# Patient Record
Sex: MALE | Race: BLACK OR AFRICAN AMERICAN | NOT HISPANIC OR LATINO | Employment: STUDENT | URBAN - METROPOLITAN AREA
[De-identification: names, ages, dates, MRNs, and addresses within clinical notes are randomized per-mention and may not be internally consistent; named-entity substitution may affect disease eponyms.]

---
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
## 2022-01-06 ENCOUNTER — OFFICE VISIT (OUTPATIENT)
Dept: SPORTS MEDICINE | Facility: CLINIC | Age: 22
End: 2022-01-06
Payer: COMMERCIAL

## 2022-01-06 ENCOUNTER — CLINICAL SUPPORT (OUTPATIENT)
Dept: REHABILITATION | Facility: HOSPITAL | Age: 22
End: 2022-01-06
Payer: COMMERCIAL

## 2022-01-06 VITALS
HEART RATE: 52 BPM | BODY MASS INDEX: 23.43 KG/M2 | DIASTOLIC BLOOD PRESSURE: 67 MMHG | SYSTOLIC BLOOD PRESSURE: 114 MMHG | HEIGHT: 72 IN | WEIGHT: 173 LBS

## 2022-01-06 DIAGNOSIS — Z98.890 S/P ACL RECONSTRUCTION: Primary | ICD-10-CM

## 2022-01-06 DIAGNOSIS — M25.562 ACUTE PAIN OF LEFT KNEE: ICD-10-CM

## 2022-01-06 PROCEDURE — 1159F PR MEDICATION LIST DOCUMENTED IN MEDICAL RECORD: ICD-10-PCS | Mod: CPTII,S$GLB,, | Performed by: ORTHOPAEDIC SURGERY

## 2022-01-06 PROCEDURE — 99999 PR PBB SHADOW E&M-EST. PATIENT-LVL III: ICD-10-PCS | Mod: PBBFAC,,, | Performed by: ORTHOPAEDIC SURGERY

## 2022-01-06 PROCEDURE — 3008F BODY MASS INDEX DOCD: CPT | Mod: CPTII,S$GLB,, | Performed by: ORTHOPAEDIC SURGERY

## 2022-01-06 PROCEDURE — 1159F MED LIST DOCD IN RCRD: CPT | Mod: CPTII,S$GLB,, | Performed by: ORTHOPAEDIC SURGERY

## 2022-01-06 PROCEDURE — 99214 PR OFFICE/OUTPT VISIT, EST, LEVL IV, 30-39 MIN: ICD-10-PCS | Mod: S$GLB,,, | Performed by: ORTHOPAEDIC SURGERY

## 2022-01-06 PROCEDURE — 3074F PR MOST RECENT SYSTOLIC BLOOD PRESSURE < 130 MM HG: ICD-10-PCS | Mod: CPTII,S$GLB,, | Performed by: ORTHOPAEDIC SURGERY

## 2022-01-06 PROCEDURE — 99214 OFFICE O/P EST MOD 30 MIN: CPT | Mod: S$GLB,,, | Performed by: ORTHOPAEDIC SURGERY

## 2022-01-06 PROCEDURE — 3074F SYST BP LT 130 MM HG: CPT | Mod: CPTII,S$GLB,, | Performed by: ORTHOPAEDIC SURGERY

## 2022-01-06 PROCEDURE — 3008F PR BODY MASS INDEX (BMI) DOCUMENTED: ICD-10-PCS | Mod: CPTII,S$GLB,, | Performed by: ORTHOPAEDIC SURGERY

## 2022-01-06 PROCEDURE — 99999 PR PBB SHADOW E&M-EST. PATIENT-LVL III: CPT | Mod: PBBFAC,,, | Performed by: ORTHOPAEDIC SURGERY

## 2022-01-06 PROCEDURE — 3078F PR MOST RECENT DIASTOLIC BLOOD PRESSURE < 80 MM HG: ICD-10-PCS | Mod: CPTII,S$GLB,, | Performed by: ORTHOPAEDIC SURGERY

## 2022-01-06 PROCEDURE — 3078F DIAST BP <80 MM HG: CPT | Mod: CPTII,S$GLB,, | Performed by: ORTHOPAEDIC SURGERY

## 2022-01-06 PROCEDURE — 97110 THERAPEUTIC EXERCISES: CPT | Mod: CQ

## 2022-01-06 NOTE — PROGRESS NOTES
Physical Therapy Progress Note     Name: Jose Emery  Clinic Number: [id number]    Therapy Diagnosis:   Encounter Diagnosis   Name Primary?    Acute pain of left knee      Physician: Robinson Corrigan PA-C    Visit Date: 1/6/2022    Physician Orders: PT Eval and Treat   Medical Diagnosis from Referral: S83.512A (ICD-10-CM) - Rupture of anterior cruciate ligament of left knee, initial encounter  Evaluation Date: 2/11/2021  Authorization Period Expiration: 12/31/2021  Plan of Care Expiration: 7/29/2021  Visit # / Visits authorized: 33/30     Time In: 0815  Time Out:0910  Total Billable Time:  53 minutes     Precautions: Standard, s/p L ACL reconstruction with BTB graft on 2/10/2021; Postop plan is for him to proceed with a bone-patellar tendon-bone autograft ACL reconstruction protocol.     PROCEDURES on 2/10/2021:   1. Left knee anterior cruciate ligament reconstruction with ipsilateral bone-patellar tendon-bone autograft.   2. Left knee arthroscopic chondroplasty  3. Left knee arthroscopic loose body removal    Subjective   Pt no pain today and has been working out with [employer] w/o issues. Min soreness with cutting.     Pt is 9 months post op  He was compliant with home exercise program.  Response to previous treatment: no adverse effects  Functional change: working out with team     Pain: 0/10  Location: left knee     Objective     Performance Evaluation: 1/6/22  ROM:  6 degrees hyperextension to 135 degrees flexion      Jose received therapeutic activities to develop strength, endurance, ROM and flexibility for 45 minutes including:  Elliptical 10' 1' fwd/1'bwd rotate for increased ROM, endurance and circulation lv 5  Dynamic functional mobility work  <-> jog/back pedal 3 x  <-> lateral shuffle 3 x  <-> carioca 3 x  <-> butt kick/high knee/ A skip/ power skip  <-> knee pull/quad pull  <-> walking lunge  <-> hip hinge/leg swing  <-> Frankenstein  <-> Lateral Over/Under  <-> WGS  <-> hip flexor/hamstring  stretch/ closed gate  3 x up/down dog/ heel stretch/ inch worm    1/6/2022  Strength:  Biodex test: 60 degrees/second  60 degree/second Quad 22.% deficit, Hamstring 3.5% deficit  Detailed report in media section    Sport cord test : passed last week   45/54 ( 46/54) is passing    Ice x 10 mins L knee for comfort.    Home Exercises Provided and Patient Education Provided     Education provided:   - proper technique for exercises, exercise selection, load progression    Written Home Exercises Provided: Patient instructed to cont prior HEP.  Exercises were reviewed and Jose was able to demonstrate them prior to the end of the session.  Jose demonstrated good  understanding of the education provided.     See EMR under Patient Instructions for exercises provided at 6-17-21.    Assessment   Pt passing sports cord test and retake of Biodex. No increased pain symptoms during warm up or testing. Pt with MD visit for possible return to sports clearance.   Jose is progressing well towards his goals.   Pt prognosis is Good.     Pt will continue to benefit from skilled outpatient physical therapy to address the deficits listed in the problem list box on initial evaluation, provide pt/family education and to maximize pt's level of independence in the home and community environment.     Pt's spiritual, cultural and educational needs considered and pt agreeable to plan of care and goals.    Anticipated barriers to physical therapy: none      GOALS: Short Term Goals:  12 weeks  1.Report decreased L knee pain  < / =  0/10  to increase tolerance for walking/ADLs.- met  2. Increase knee ROM to 10 hyperextension to 135 degrees flexion in order to be able to perform ADLs without difficulty.-met  3. Increase strength to 4-/5 MMT grade in L LE  to increase tolerance for ADL and work activities.- not met progressing  4. Pt to tolerate HEP to improve ROM and independence with ADL's-met  5. Pt will be able to perform SL squat without  c/o pain or compensation.- met     Long Term Goals: 24 weeks- Not met  1.Report decreased L knee pain < / = 0/10  to increase tolerance for recreational activities.- met  2.Patient goal: To be able to play basketball again.  - progressing  3.Increase strength to >/= 4+/5 in L LE  to increase tolerance for ADL and work activities.-  met  4. Pt will be able to perform jumping/hopping/running without compensation and without c/o pain.- progressing    Plan     Will see pt in 4 weeks to retest.      Ed Echavarria, PTA, STS

## 2022-01-06 NOTE — PROGRESS NOTES
CC: Left knee ACL post op 11 months    Patient is here for his 11 months post op appointment s/p below and is doing well.   Patient is doing PT at [facility] location and is progressing as expected.  He is currently progressing deceleration and cutting.   He goes to Pt 1x weekly. He is working with AT at school and completing strength and conditioning.   He has returned to practice with no issues.     He has passed his sport cord test.       DATE OF PROCEDURE: 2/10/2021      PREOPERATIVE DIAGNOSES:   1. Left knee anterior cruciate ligament tear.   2. Left knee chondromalacia  3. Left knee loose body     POSTOPERATIVE DIAGNOSES:   1. Left knee anterior cruciate ligament tear.   2. Left knee chondromalacia  3. Left knee loose body     PROCEDURES:   1. Left knee anterior cruciate ligament reconstruction with ipsilateral bone-patellar tendon-bone autograft.   2. Left knee arthroscopic chondroplasty  3. Left knee arthroscopic loose body removal     SURGEON: Ed Carbajal M.D.    PE:    /67   Pulse (!) 52   Ht 6' (1.829 m)   Wt 78.5 kg (173 lb)   BMI 23.46 kg/m²      Left knee:    Incision clean/dry/intact  No sign of infection  mild joint effusion  Compartments soft  Neurovascular status intact in extremity    PROM 0 to 130 degrees  Decreased quad strength  Minimal to no effusion    X-rays left knee (2/25/2021):  FINDINGS:  Two views: There is ACL repair and a joint effusion.  No fracture dislocation bone destruction or significant complication seen.    Assessment:  11 months s/p left knee medial reconstruction with ipsilateral BTB autograft, chondroplasty, loose body removal    Plan:  1. Return to sport with no restrictions.     2. 1 - 2 sessions with Justice Sorto.     All questions were answered. Instructed patient to call with questions or concerns in the interim.

## 2022-10-26 DIAGNOSIS — L02.413 CUTANEOUS ABSCESS OF RIGHT UPPER EXTREMITY: Primary | ICD-10-CM

## 2022-10-26 RX ORDER — SULFAMETHOXAZOLE AND TRIMETHOPRIM 400; 80 MG/1; MG/1
1 TABLET ORAL 2 TIMES DAILY
Qty: 20 TABLET | Refills: 0 | Status: SHIPPED | OUTPATIENT
Start: 2022-10-26 | End: 2022-11-05

## 2022-10-26 NOTE — PROGRESS NOTES
[facility] NOTE  Date of Evaluation: 10/26/2022    HPI:  Right elbow with tender fluid collection    : Keri  Sport:  MBB  Year: Connor  Major:  [employer]  [place]:  [facility]      REVIEW OF SYSTEMS:   Constitution: Patient denies fever or chills.  Eyes: Patient denies eye pain or vision changes.  CVS: Patient denies chest pain.  Lungs: Patient denies shortness of breath or cough.  Abdomen: Patient denies any stomach pain, nausea, vomiting, or diarrhea  Skin: Patient denies skin rash or itching.    Musculoskeletal: Patient denies recent injuries or trauma.  Neuro: Patient denies any numbness or tingling in upper or lower extremities.  Psych: Patient denies any current anxiety or nervousness.     PHYSICAL EXAMINATION:  Gen: NAD.  Psych: Affect & judgment nl.  Neuro: Grossly CNI. PUGH.  HEENT: -Trach dev. -Eye d/c. -Rhinorrhea.  CV: Color nl. -E/C/C. WWPx4.  Pulm: -Dyspnea. -Cough.  Lymph: -Edema.  Int: +TTP .5cm abscess right elbow    ASSESSMENT:  right elbow abscess if this we      PLAN:  Bactrim DS BID x 10 days              All questions have been answered.  Plan was discussed with athletes .  Communication was sent to my [facility] regarding any orders/appointments/labs that need to be scheduled.    This note was completed in the presence of the physician noted above    This note is dictated using the M*Modal Fluency Direct word recognition program. There are word recognition mistakes that are occasionally missed on review.

## 2023-01-31 ENCOUNTER — ATHLETIC TRAINING SESSION (OUTPATIENT)
Dept: SPORTS MEDICINE | Facility: CLINIC | Age: 23
End: 2023-01-31
Payer: COMMERCIAL

## 2023-02-01 NOTE — PROGRESS NOTES
Subjective:      On behalf of 23:    Chief Complaint: Jose Emery is a 22 y.o. male student at [employer] who complains of R knee pain. Athlete has no PMHx of R knee injuries. No radiating pain experienced. Jose mentions he was participating in a home mbb game and he took a fall resulting in his leg bending into an akward position resulting in him experiencing a sharp pain. P! Scale 6/10.    Handedness: right-handed  Sport played:      Level:          Jose also participates in basketball.    ROS                Objective:        General: Jose is well-developed, well-nourished, appears stated age, in no acute distress, alert and oriented to time, place and person.             Right Ankle/Foot Exam     Tests   Heel Walk: able to perform  Tiptoe Walk: able to perform  Single Heel Rise: able to perform  Double Heel Rise: unable to perform double heel rise      Right Knee Exam     Tenderness   The patient is tender to palpation of the MCL and medial joint line.    Tests   Meniscus   Jaylon:  Medial - negative   Ligament Examination   MCL - Valgus: abnormal - grade I  Pivot Shift: abnormal    Comments:  Athlete had pain with supine single leg raise and bring knee to chest actively, however was still able to complete.     Left Knee Exam   Left knee exam is normal.Back (L-Spine & T-Spine) / Neck (C-Spine) Exam     Back (L-Spine & T-Spine) Tests   Right Side Tests  Squat Test: able to perform      Muscle Strength   Right Lower Extremity   Hip Abduction: 5/5   Quadriceps:  5/5   Hamstrin/5             Assessment:         Gr 1 MCL Sprain        Plan:         1. Athlete instructed to refrain from all physical activity. Instructed on plan for MCL return could be up to 2 weeks. Primary focus on pain management then progress. Athlete understood.  2. Physician Referral: No  3. ED Referral: no  4. Parent/Guardian Notified: No  5. All questions were answered, ath. will contact me for questions or concerns  in  the interim.  6.         Eligible to use School Insurance: Yes

## 2023-03-22 ENCOUNTER — HOSPITAL ENCOUNTER (EMERGENCY)
Facility: OTHER | Age: 23
Discharge: HOME | End: 2023-03-22
Attending: EMERGENCY MEDICINE
Payer: COMMERCIAL

## 2023-03-22 VITALS
DIASTOLIC BLOOD PRESSURE: 64 MMHG | HEART RATE: 58 BPM | BODY MASS INDEX: 23.7 KG/M2 | HEIGHT: 72 IN | SYSTOLIC BLOOD PRESSURE: 135 MMHG | WEIGHT: 175 LBS | TEMPERATURE: 99 F | OXYGEN SATURATION: 97 % | RESPIRATION RATE: 14 BRPM

## 2023-03-22 DIAGNOSIS — S01.81XA FACIAL LACERATION, INITIAL ENCOUNTER: Primary | ICD-10-CM

## 2023-03-22 PROCEDURE — 99282 EMERGENCY DEPT VISIT SF MDM: CPT | Mod: 25

## 2023-03-22 PROCEDURE — 12011 RPR F/E/E/N/L/M 2.5 CM/<: CPT

## 2023-03-22 PROCEDURE — 25000003 PHARM REV CODE 250: Performed by: EMERGENCY MEDICINE

## 2023-03-22 RX ORDER — LIDOCAINE HYDROCHLORIDE 10 MG/ML
10 INJECTION INFILTRATION; PERINEURAL
Status: COMPLETED | OUTPATIENT
Start: 2023-03-22 | End: 2023-03-22

## 2023-03-22 RX ADMIN — LIDOCAINE HYDROCHLORIDE 10 ML: 10 INJECTION, SOLUTION INFILTRATION; PERINEURAL at 10:03

## 2023-03-22 NOTE — Clinical Note
"Jose Raygozaemy" Yuly was seen and treated in our emergency department on 3/22/2023.  He may return to school on 03/24/2023.      If you have any questions or concerns, please don't hesitate to call.      Johanna Cho MD"

## 2023-03-23 NOTE — DISCHARGE INSTRUCTIONS
Return in approximately 1 week for suture removal      Please return to the ER if you have chest pain, difficulty breathing, fevers, altered mental status, dizziness, weakness, or any other concerns.

## 2023-03-23 NOTE — ED PROVIDER NOTES
Encounter Date: 3/22/2023    SCRIBE #1 NOTE: Tammie WOODS, am scribing for, and in the presence of,  Johanna Cho MD.     History     Chief Complaint   Patient presents with    Laceration     PT with lacertion below left eye after head butted another person while basketball. Bleeding controlled at this time.      Time seen by provider: 9:35 PM    Jose Emery is a 22 y.o. male, who presents with a laceration below his left eye after head butting somebody in a basketball game just prior to arrival at the ED. The patient reports feeling dizzy immediately after the incident, but with no loss of consciousness.  He feels well at this time.  He states that he cleaned the wound with hydrogen peroxide. The patient states he is up to date on vaccinations.  Denies any neck or back pain.  Denies any other injury.  Denies any blurry vision, double vision, or eye pain.  This is the extent of the patient's complaints at this time.        The history is provided by the patient.   Review of patient's allergies indicates:  No Known Allergies  No past medical history on file.  Past Surgical History:   Procedure Laterality Date    ARTHROSCOPIC CHONDROPLASTY OF KNEE JOINT Left 2/10/2021    Procedure: ARTHROSCOPY, KNEE, WITH CHONDROPLASTY;  Surgeon: Ed Carbajal MD;  Location: [facility] OR;  Service: Orthopedics;  Laterality: Left;  Regional with catheter adductor     ARTHROSCOPIC REMOVAL OF LOOSE BODY FROM JOINT Left 2/10/2021    Procedure: REMOVAL, LOOSE BODY, JOINT, ARTHROSCOPIC;  Surgeon: Ed Carbajal MD;  Location: [facility] OR;  Service: Orthopedics;  Laterality: Left;    KNEE ARTHROSCOPY W/ ACL RECONSTRUCTION Left 2/10/2021    Procedure: RECONSTRUCTION, KNEE, ACL, ARTHROSCOPIC With BTB GRAPH;  Surgeon: Ed Carbajal MD;  Location: [facility] OR;  Service: Orthopedics;  Laterality: Left;     No family history on file.  Social History     Tobacco Use    Smoking status: Light Smoker    Smokeless tobacco: Never      Review of Systems   Constitutional:  Negative for chills and fever.   HENT:  Negative for congestion, rhinorrhea and sore throat.    Eyes:  Negative for photophobia, pain and visual disturbance.   Respiratory:  Negative for chest tightness and shortness of breath.    Cardiovascular:  Negative for chest pain and palpitations.   Gastrointestinal:  Negative for abdominal pain, diarrhea, nausea and vomiting.   Genitourinary:  Negative for dysuria and flank pain.   Musculoskeletal:  Negative for back pain and neck pain.   Skin:  Positive for wound. Negative for color change and rash.   Neurological:  Positive for dizziness. Negative for weakness and headaches.   Hematological:  Does not bruise/bleed easily.     Physical Exam     Initial Vitals [03/22/23 2039]   BP Pulse Resp Temp SpO2   135/64 (!) 58 14 99.2 °F (37.3 °C) 97 %      MAP       --         Physical Exam    Nursing note and vitals reviewed.  Constitutional: He appears well-developed and well-nourished. He is not diaphoretic. No distress.   HENT:   Head: Normocephalic.   Nose: Nose normal.   Eyes: Conjunctivae and EOM are normal.   Neck: Neck supple.   Pulmonary/Chest: No stridor. No respiratory distress.   Musculoskeletal:      Cervical back: Neck supple. Normal.      Thoracic back: Normal.      Lumbar back: Normal.     Neurological: He is alert and oriented to person, place, and time. No cranial nerve deficit.   Ambulatory with steady gait.   Skin: Skin is warm and dry. No pallor.   1 centimeter curvilinear laceration just lateral to the left eye. Bleeding controlled.   Psychiatric: He has a normal mood and affect. Thought content normal.       ED Course   Lac Repair    Date/Time: 3/22/2023 10:41 PM  Performed by: Johanna Cho MD  Authorized by: Johanna Cho MD     Consent:     Consent obtained:  Verbal    Consent given by:  Patient    Risks, benefits, and alternatives were discussed: yes      Risks discussed:  Infection and need for additional  repair    Alternatives discussed:  No treatment  Universal protocol:     Patient identity confirmed:  Verbally with patient  Anesthesia:     Anesthesia method:  Local infiltration    Local anesthetic:  Lidocaine 1% w/o epi  Laceration details:     Location:  Face    Face location:  L eyebrow    Length (cm):  1  Pre-procedure details:     Preparation:  Patient was prepped and draped in usual sterile fashion  Treatment:     Area cleansed with:  Shur-Clens    Amount of cleaning:  Standard    Debridement:  None    Undermining:  None  Skin repair:     Repair method:  Sutures    Suture size:  6-0    Suture material:  Prolene    Suture technique:  Simple interrupted    Number of sutures:  3  Approximation:     Approximation:  Close  Repair type:     Repair type:  Simple  Post-procedure details:     Dressing:  Open (no dressing)    Procedure completion:  Tolerated well, no immediate complications  Labs Reviewed - No data to display       Imaging Results    None          Medications   LIDOcaine HCL 10 mg/ml (1%) injection 10 mL (10 mLs Infiltration Given by Provider 3/22/23 1980)     Medical Decision Making:   History:   Old Medical Records: I decided to obtain old medical records.  Old Records Summarized: other records and records from clinic visits.  Initial Assessment:   9:35PM:  Patient is a 22 year old male who presents to the emergency department after a facial laceration sustained during a basketball injury.  Patient appears well, nontoxic.  He is otherwise neurologically intact.  Patient will require sutures for repair.  I do not feel that imaging is warranted at this time.  He does not need a tetanus shot.  Will plan for irrigation, repair, will continue to follow and reassess.     10:40PM:  Patient doing well, remains stable.  He tolerated suture repair with no issues.  I counseled the patient regarding wound care management.  He will return in approximately 1 week for suture removal.  I do not feel that further  work up in the ED is indicated at this time.  I updated pt regarding results and I counseled pt regarding supportive care measures.  I have discussed with the pt ED return warnings and need for close PCP f/u.  Pt agreeable to plan and all questions answered.  I feel that pt is stable for discharge and management as an outpatient and no further intervention is needed at this time.  Pt is comfortable returning to the ED if needed.  Will DC home in stable condition.       Scribe Attestation:   Scribe #1: I performed the above scribed service and the documentation accurately describes the services I performed. I attest to the accuracy of the note.            Physician Attestation for Scribe: I, Johanna Cho, reviewed documentation as scribed in my presence, which is both accurate and complete.       Clinical Impression:   Final diagnoses:  [S01.81XA] Facial laceration, initial encounter (Primary)        ED Disposition Condition    Discharge Stable          ED Prescriptions    None       Follow-up Information       Follow up With Specialties Details Why Contact Info    [facility] - Emergency Dept Emergency Medicine In 1 week For suture removal [id number] [facility] [id number] [phone]             Johanna Cho MD  03/23/23 0002

## 2023-09-08 ENCOUNTER — ATHLETIC TRAINING SESSION (OUTPATIENT)
Dept: SPORTS MEDICINE | Facility: CLINIC | Age: 23
End: 2023-09-08
Payer: COMMERCIAL

## 2023-09-08 DIAGNOSIS — S69.92XA INJURY OF LEFT WRIST, INITIAL ENCOUNTER: Primary | ICD-10-CM

## 2023-09-08 NOTE — PROGRESS NOTES
"Subjective:       Chief Complaint: Jose Emery is a 23 y.o. male student at [employer] who had concerns including Injury of the Left Wrist.    SUBJECTIVE:  Jose Emery is a 23 y.o. male who sustained a left wrist injury 2 week(s) ago.States that because the injury isn't impacting his running, he "didn't think it was that much of a problem" . Mechanism of injury: FOSH. Immediate symptoms: delayed pain, was able to use hand directly after injury. Symptoms have been intermittent since that time. Prior history of related problems: no prior problems with this area in the past. 3/10. Pain started in the 5th metacarpal but has now moved to the distal radius. Pain with WB activities like pushups, or gripping the bar during bench press.     OBJECTIVE:    Appearance: alert, well appearing, and in no distress.  Wrist exam: reduced range of motion - extension, radial deviation, ulnar deviation, radial pulse normal, sensation normal, negative Phalen, negative Tinel, normal contralateral hand and wrist.    ASSESSMENT:  wrist strain    PLAN:  rest the injured area as much as practical, compressive bandage      Handedness: right-handed  Sport played: basketball      Level:      Position:gaurd      Injury  This is a new problem. The current episode started 1 to 4 weeks ago. The problem has been gradually improving.       Review of Systems   All other systems reviewed and are negative.                Objective:       General: Jose is well-developed, well-nourished, appears stated age, in no acute distress, alert and oriented to time, place and person.                 Right Hand/Wrist Exam   Right hand exam is normal.      Left Hand/Wrist Exam     Inspection   Scars: Wrist - absent Hand -  absent  Effusion: Wrist - absent   Bruising: Wrist - absent Hand -  absent  Deformity: Wrist - absent Hand -  absent    Pain   Wrist - The patient exhibits pain of the TFCC.    Tenderness   The patient is tender to palpation of the " ulnar area.     Range of Motion     Wrist   Extension:  abnormal   Flexion:  abnormal   Pronation:  abnormal   Supination:  abnormal   Adduction: abnormal    Tests   Phalens Sign: negative  Carpal Tunnel Compression Test: negative  Cubital Tunnel Compression Test: negative    Atrophy  Hypothenar:  negative    Other     Sensory Exam  Median Distribution: normal  Ulnar Distribution: normal  Radial Distribution: normal          Muscle Strength   Left Upper Extremity  Wrist extension: 4/5   Wrist flexion: 4/5   :  4/5     Vascular Exam       Capillary Refill  Left Hand: normal capillary refill                Assessment:     L ECU Strain, potential TFCC sprain    Status: F - Full Participation    Date Out: n/a    Date Cleared: n/a      Plan:       1. Tape, modify as tolerated, rehab  2. Physician Referral: no  3. ED Referral: no  4. Parent/Guardian Notified: No  5. All questions were answered, ath. will contact me for questions or concerns in  the interim.  6.         Eligible to use School Insurance: Yes

## 2023-09-13 ENCOUNTER — OFFICE VISIT (OUTPATIENT)
Dept: URGENT CARE | Facility: CLINIC | Age: 23
End: 2023-09-13
Payer: COMMERCIAL

## 2023-09-13 VITALS
SYSTOLIC BLOOD PRESSURE: 136 MMHG | DIASTOLIC BLOOD PRESSURE: 85 MMHG | WEIGHT: 175 LBS | TEMPERATURE: 99 F | BODY MASS INDEX: 23.7 KG/M2 | HEART RATE: 52 BPM | OXYGEN SATURATION: 100 % | RESPIRATION RATE: 18 BRPM | HEIGHT: 72 IN

## 2023-09-13 DIAGNOSIS — N34.2 URETHRITIS: ICD-10-CM

## 2023-09-13 DIAGNOSIS — Z20.2 POSSIBLE EXPOSURE TO STD: Primary | ICD-10-CM

## 2023-09-13 LAB
BILIRUB UR QL STRIP: NEGATIVE
GLUCOSE UR QL STRIP: NEGATIVE
KETONES UR QL STRIP: NEGATIVE
LEUKOCYTE ESTERASE UR QL STRIP: NEGATIVE
PH, POC UA: 6 (ref 5–8)
POC BLOOD, URINE: NEGATIVE
POC NITRATES, URINE: NEGATIVE
PROT UR QL STRIP: NEGATIVE
SP GR UR STRIP: 1.02 (ref 1–1.03)
UROBILINOGEN UR STRIP-ACNC: NORMAL (ref 0.3–2.2)

## 2023-09-13 PROCEDURE — 96372 THER/PROPH/DIAG INJ SC/IM: CPT | Mod: S$GLB,,, | Performed by: FAMILY MEDICINE

## 2023-09-13 PROCEDURE — 87563 M. GENITALIUM AMP PROBE: CPT | Performed by: FAMILY MEDICINE

## 2023-09-13 PROCEDURE — 99214 OFFICE O/P EST MOD 30 MIN: CPT | Mod: 25,S$GLB,, | Performed by: FAMILY MEDICINE

## 2023-09-13 PROCEDURE — 81003 POCT URINALYSIS, DIPSTICK, AUTOMATED, W/O SCOPE: ICD-10-PCS | Mod: QW,S$GLB,, | Performed by: FAMILY MEDICINE

## 2023-09-13 PROCEDURE — 87661 TRICHOMONAS VAGINALIS AMPLIF: CPT | Performed by: FAMILY MEDICINE

## 2023-09-13 PROCEDURE — 87591 N.GONORRHOEAE DNA AMP PROB: CPT | Performed by: FAMILY MEDICINE

## 2023-09-13 PROCEDURE — 87491 CHLMYD TRACH DNA AMP PROBE: CPT | Performed by: FAMILY MEDICINE

## 2023-09-13 PROCEDURE — 99214 PR OFFICE/OUTPT VISIT, EST, LEVL IV, 30-39 MIN: ICD-10-PCS | Mod: 25,S$GLB,, | Performed by: FAMILY MEDICINE

## 2023-09-13 PROCEDURE — 81003 URINALYSIS AUTO W/O SCOPE: CPT | Mod: QW,S$GLB,, | Performed by: FAMILY MEDICINE

## 2023-09-13 PROCEDURE — 96372 PR INJECTION,THERAP/PROPH/DIAG2ST, IM OR SUBCUT: ICD-10-PCS | Mod: S$GLB,,, | Performed by: FAMILY MEDICINE

## 2023-09-13 RX ORDER — CEFTRIAXONE 500 MG/1
500 INJECTION, POWDER, FOR SOLUTION INTRAMUSCULAR; INTRAVENOUS
Status: COMPLETED | OUTPATIENT
Start: 2023-09-13 | End: 2023-09-13

## 2023-09-13 RX ORDER — DOXYCYCLINE HYCLATE 100 MG
100 TABLET ORAL 2 TIMES DAILY
Qty: 20 TABLET | Refills: 0 | Status: SHIPPED | OUTPATIENT
Start: 2023-09-13 | End: 2023-09-23

## 2023-09-13 RX ADMIN — CEFTRIAXONE 500 MG: 500 INJECTION, POWDER, FOR SOLUTION INTRAMUSCULAR; INTRAVENOUS at 05:09

## 2023-09-13 NOTE — PROGRESS NOTES
Subjective:      Patient ID: Jose Emery is a 23 y.o. male.    Vitals:  height is 6' (1.829 m) and weight is 79.4 kg (175 lb). His oral temperature is 98.6 °F (37 °C). His blood pressure is 136/85 and his pulse is 52 (abnormal). His respiration is 18 and oxygen saturation is 100%.     Chief Complaint: Exposure to STD    Patient comes in with recurrent symptoms. He states that he was recently tested for STD and the providers prescribed him Doxycyline at the time of visit but he is unsure what his test results.. He states that when he was finijshed with the pills, the symptoms came back.  He is experiencing some dysuria and some penile discharge.  Denies any fever abdominal pain.  No external rashes.  He is sexually active.    Exposure to STD  This is a recurrent problem. The current episode started 1 to 4 weeks ago. The problem occurs constantly. The patient is experiencing no pain. Pertinent negatives include no abdominal pain, anorexia, chest pain, chills, constipation, coughing, diarrhea, discolored urine, dysuria, fever, flank pain, frequency, headaches, hematuria, hesitancy, joint pain, joint swelling, nausea, painful intercourse, rash, shortness of breath, sore throat, urgency, urinary retention or vomiting. Nothing aggravates the symptoms. He has tried nothing for the symptoms. He is sexually active. He never uses condoms. It is possible that his partner has an STD. There is no history of BPH, cryptorchidism, erectile aid use, erectile dysfunction, a femoral hernia, herpes simplex, HIV, an inguinal hernia, kidney stones, prostatitis, sickle cell disease, syphilis or varicocele.       Constitution: Negative for chills and fever.   HENT:  Negative for sore throat.    Cardiovascular:  Negative for chest pain.   Respiratory:  Negative for cough and shortness of breath.    Gastrointestinal:  Negative for abdominal pain, nausea, vomiting, constipation and diarrhea.   Genitourinary:  Negative for dysuria,  frequency, urgency and flank pain.   Skin:  Negative for rash.   Neurological:  Negative for headaches.      Objective:     Physical Exam  Constitutional: Pt oriented to person, place, and time.  Non-toxic appearance.   Patient does not appear ill. No distress. normal  HENT: No icterus or facial swelling appreciated  Head: Normocephalic and atraumatic.   Nose: No congestion.   Pulmonary/Chest: Effort normal. No stridor. No respiratory distress.   Abdominal: Normal appearance. Abdomen exhibits no distension.   Musculoskeletal:         General: No swelling.   Neurological: no focal deficit. Patient is alert and oriented to person, place, and time.   Skin: Skin is not diaphoretic and not pale. no jaundice  Psychiatric: Patients behavior is normal. Mood, judgment and thought content normal.     Assessment:     1. Possible exposure to STD    2. Urethritis        Plan:       Possible exposure to STD  -     POCT Urinalysis, Dipstick, Automated, W/O Scope    Urethritis  -     C. trachomatis/N. gonorrhoeae by AMP DNA  -     Mycoplasma genitalium Molecular Detection, PCR Urine  -     Trichomonas vaginalis, RNA, Qual, Urine  -     Ambulatory referral/consult to Urology    Other orders  -     cefTRIAXone injection 500 mg  -     doxycycline (VIBRA-TABS) 100 MG tablet; Take 1 tablet (100 mg total) by mouth 2 (two) times daily. for 10 days  Dispense: 20 tablet; Refill: 0      Pt advised following: Rest and hydrate and take antibiotics as prescribed.    We will do the STI screenings as discussed.  We will screen for gonorrhea and chlamydia and Trichomonas and mycoplasma.      We are treating empirically with antibiotics now however we may need to change in treatment of the results come back.    If the results are all negative and your still having symptoms I do recommend you follow up with the urologist.    You can call our  line at [phone] and they can assist you in making this specialist appointment      In addition  you should advise all partners to be tested and treated for STIs before reinitiating any sexual contact to avoid transmitting STIs back and forth

## 2023-09-13 NOTE — PATIENT INSTRUCTIONS
Rest and hydrate and take antibiotics as prescribed.    We will do the STI screenings as discussed.  We will screen for gonorrhea and chlamydia and Trichomonas and mycoplasma.      We are treating empirically with antibiotics now however we may need to change in treatment of the results come back.    If the results are all negative and your still having symptoms I do recommend you follow up with the urologist.    You can call our  line at [phone] and they can assist you in making this specialist appointment      In addition you should advise all partners to be tested and treated for STIs before reinitiating any sexual contact to avoid transmitting STIs back and forth.

## 2023-09-14 LAB
C TRACH DNA SPEC QL NAA+PROBE: NOT DETECTED
N GONORRHOEA DNA SPEC QL NAA+PROBE: NOT DETECTED

## 2023-09-15 LAB
M GENITALIUM DNA UR QL NAA+PROBE: POSITIVE
SPECIMEN SOURCE: ABNORMAL
SPECIMEN SOURCE: NORMAL
T VAGINALIS RRNA SPEC QL NAA+PROBE: NEGATIVE

## 2023-09-17 ENCOUNTER — TELEPHONE (OUTPATIENT)
Dept: URGENT CARE | Facility: CLINIC | Age: 23
End: 2023-09-17
Payer: COMMERCIAL

## 2023-09-17 NOTE — TELEPHONE ENCOUNTER
Left voicemail message for patient regarding lab results.  Tested positive for mycoplasma genitalia

## 2023-09-18 DIAGNOSIS — A49.3 INFECTION DUE TO MYCOPLASMA GENITALIUM: Primary | ICD-10-CM

## 2023-09-18 RX ORDER — AZITHROMYCIN 500 MG/1
TABLET, FILM COATED ORAL
Qty: 5 TABLET | Refills: 0 | Status: SHIPPED | OUTPATIENT
Start: 2023-09-18

## 2023-10-09 ENCOUNTER — ATHLETIC TRAINING SESSION (OUTPATIENT)
Dept: SPORTS MEDICINE | Facility: CLINIC | Age: 23
End: 2023-10-09
Payer: COMMERCIAL

## 2023-10-09 DIAGNOSIS — M25.562 LEFT LATERAL KNEE PAIN: Primary | ICD-10-CM

## 2023-10-11 NOTE — PROGRESS NOTES
Chief Complaint: Jose Emery is a 23 y.o. male student at [employer] who had concerns including Pain of the Left Knee.    Sport played: basketball      Level: college            Status: F - Full Participation  Date Seen:  10/9/2023  Date of Injury:  n/a  Date Out:  n/a  Date Cleared:  n/a    Rehabilitation Log:     10/10/2023    MAINTENANCE LOG  INJURY/CONDITON: L ITB Friction Syndrome      MODALITIES:    MISCELLANEOUS:       [x]Active Release   []Compression Wrap   []Cupping    []Support Wrap  []E-Stim- Compex   []Taping  [x]E-Stim- IFC    []Foam Roller  []E-Stim- Premod   []Cold Tub  []Joint Mobilization   []Contrast Tub  []Manual Therapy   [x]Hot Pack  []Massage    []Hot Tub  []Massage - Scar Tissue  []Ice Cup  []Myofascial Release   []Ice Pack  []Flossing/BFR   []GameReady  []Ultrasound  []Ultrasound - Phonophoresis  []Instrumental Assisted Soft Tissue Mobilization (IASTM)  [x]Intermittent Compression - Normatec  []Massage Gun  []ROM - Active  []ROM - Passive  []Stretching - Active  []Stretching - Dynamic  []Stretching - Passive  [x]Stretching - PNF  []Stretching - Static  []Mobility Work - Hip/Back  []Mobility Work - Ankle  []Mobility Work - Shoulder      OTHER/COMMENTS:  -Hip/Knee Mobility work    10/9/2023    MAINTENANCE LOG  INJURY/CONDITON: L ITB Friction Syndrome      MODALITIES:    MISCELLANEOUS:       []Active Release   []Compression Wrap   []Cupping    []Support Wrap  []E-Stim- Compex   []Taping  [x]E-Stim- IFC    []Foam Roller  []E-Stim- Premod   []Cold Tub  []Joint Mobilization   []Contrast Tub  []Manual Therapy   [x]Hot Pack  []Massage    []Hot Tub  []Massage - Scar Tissue  []Ice Cup  []Myofascial Release   []Ice Pack  []Flossing/BFR   []GameReady  []Ultrasound  []Ultrasound - Phonophoresis  []Instrumental Assisted Soft Tissue Mobilization (IASTM)  []Intermittent Compression - Normatec  []Massage Gun  []ROM - Active  []ROM - Passive  []Stretching - Active  []Stretching -  Dynamic  []Stretching - Passive  []Stretching - PNF  []Stretching - Static  []Mobility Work - Hip/Back  []Mobility Work - Ankle  []Mobility Work - Shoulder      OTHER/COMMENTS:  -Hip/Knee Mobility Work

## 2023-10-25 ENCOUNTER — ATHLETIC TRAINING SESSION (OUTPATIENT)
Dept: SPORTS MEDICINE | Facility: CLINIC | Age: 23
End: 2023-10-25
Payer: COMMERCIAL

## 2023-10-25 DIAGNOSIS — M25.562 LEFT LATERAL KNEE PAIN: Primary | ICD-10-CM

## 2023-10-25 DIAGNOSIS — M79.10 MUSCLE SORENESS: ICD-10-CM

## 2023-10-25 NOTE — PROGRESS NOTES
Chief Complaint: Jose Emery is a 23 y.o. male student at [employer] who had concerns including Health Maintenance.    Sport played: basketball      Level: college            Status: F - Full Participation    Date Seen:  n/a    Date of Injury:  n/a    Date Out:  n/a    Date Cleared:  n/a      Treatment Log:     10/25/2023    MAINTENANCE LOG  INJURY/CONDITON: L ITB/Quad/Recovery      MODALITIES:    MISCELLANEOUS:       [x]Active Release   []Compression Wrap   [x]Cupping    []Support Wrap  []E-Stim- Compex   []Taping  []E-Stim- IFC    [x]Foam Roller  []E-Stim- Premod   []Cold Tub  []Joint Mobilization   []Contrast Tub  []Manual Therapy   []Hot Pack  []Massage    []Hot Tub  []Massage - Scar Tissue  []Ice Cup  []Myofascial Release   []Ice Pack  []Flossing/BFR   []GameReady  []Ultrasound  []Ultrasound - Phonophoresis  [x]Instrumental Assisted Soft Tissue Mobilization (IASTM)  [x]Intermittent Compression - Normatec  []Massage Gun  []ROM - Active  []ROM - Passive  []Stretching - Active  []Stretching - Dynamic  []Stretching - Passive  []Stretching - PNF  []Stretching - Static  [x]Mobility Work - Hip/Back  []Mobility Work - Ankle  []Mobility Work - Shoulder

## 2023-10-30 ENCOUNTER — ATHLETIC TRAINING SESSION (OUTPATIENT)
Dept: SPORTS MEDICINE | Facility: CLINIC | Age: 23
End: 2023-10-30
Payer: COMMERCIAL

## 2023-10-30 DIAGNOSIS — S69.91XA INJURY OF RIGHT RING FINGER, INITIAL ENCOUNTER: Primary | ICD-10-CM

## 2023-10-30 NOTE — PROGRESS NOTES
Subjective:       Chief Complaint: Jose Emery is a 23 y.o. male student at [employer] who had concerns including Injury of the Right Hand.    SUBJECTIVE:  Jose Emery is a 23 y.o. male who sustained a right ring finger injury 2 day(s) ago. Mechanism of injury: unknown. Athlete did not notice injury to finger until later in the evening after finishing a basketball game. Immediate symptoms: immediate pain, delayed swelling, was able to use hand directly after injury. Symptoms have been constant since that time. Prior history of related problems: no prior problems with this area in the past. 4/10    OBJECTIVE:    Appearance: alert, well appearing, and in no distress.  Hand exam: soft tissue tenderness,swelling and bruising at the priximal phalange and PIP joint, reduced range of motion of PIP, scaphoid (snuffbox) tenderness absent, ecchymosis of PIP, remainder of ipsilateral wrist, hand and finger exam is normal.        Sport played: basketball      Level: college            Injury  This is a new problem. The current episode started in the past 7 days. The problem occurs constantly. The problem has been gradually improving.       Review of Systems   All other systems reviewed and are negative.                Objective:       General: Jose is well-developed, well-nourished, appears stated age, in no acute distress, alert and oriented to time, place and person.                 Right Hand/Wrist Exam     Inspection   Effusion:  Hand -  present  Bruising:  Hand -  present    Pain   Hand - The patient exhibits pain of the ring IP.    Swelling   Hand - The patient is swollen on the ring IP and ring MCP.    Range of Motion     Wrist   Extension:  normal   Flexion:  normal   Pronation:  normal   Supination:  normal   Abduction: normal  Adduction: normal    Finger Flexion   MP Ring: normal   PIP Ring: abnormal   DIP Ring: normal     Tests   Flexor Digitorum Superficialis Test: normal  Flexor Digitorum Profundus  Test: normal        Left Hand/Wrist Exam   Left hand exam is normal.          Muscle Strength   Right Upper Extremity   Ring Finger: 4/5 (p+)    Vascular Exam       Capillary Refill  Right Hand: normal capillary refill          Assessment:     R Ring Finger IP Sprain, r/o fracture    Status: AT - Cleared to Exert    Date Seen:  10/30/2023    Date of Injury:  10/28/2023    Date Out:  n/a    Date Cleared:  n/a      Plan:       1. diana GUEVARA tape/splint, refer for imaging  2. Physician Referral: yes  3. ED Referral: no  4. Parent/Guardian Notified: No  5. All questions were answered, ath. will contact me for questions or concerns in  the interim.  6.         Eligible to use School Insurance: Yes

## 2023-10-31 ENCOUNTER — HOSPITAL ENCOUNTER (OUTPATIENT)
Dept: RADIOLOGY | Facility: HOSPITAL | Age: 23
Discharge: HOME | End: 2023-10-31
Attending: STUDENT IN AN ORGANIZED HEALTH CARE EDUCATION/TRAINING PROGRAM
Payer: COMMERCIAL

## 2023-10-31 DIAGNOSIS — S69.91XA INJURY OF RIGHT RING FINGER, INITIAL ENCOUNTER: ICD-10-CM

## 2023-10-31 PROCEDURE — 73140 X-RAY EXAM OF FINGER(S): CPT | Mod: 26,RT,, | Performed by: RADIOLOGY

## 2023-10-31 PROCEDURE — 73140 X-RAY EXAM OF FINGER(S): CPT | Mod: TC,PN

## 2023-10-31 PROCEDURE — 73140 XR FINGER 2 OR MORE VIEWS: ICD-10-PCS | Mod: 26,RT,, | Performed by: RADIOLOGY

## 2024-01-03 ENCOUNTER — ATHLETIC TRAINING SESSION (OUTPATIENT)
Dept: SPORTS MEDICINE | Facility: CLINIC | Age: 24
End: 2024-01-03
Payer: COMMERCIAL

## 2024-01-03 DIAGNOSIS — M79.10 MUSCLE SORENESS: Primary | ICD-10-CM

## 2024-01-13 NOTE — PROGRESS NOTES
Chief Complaint: Jose Emery is a 23 y.o. male student at [employer] who had concerns including Health Maintenance.    Sport played: basketball      Level: college            Status: F - Full Participation  Date Seen:  n/a  Date of Injury:  n/a  Date Out:  n/a  Date Cleared:  n/a    Treatment Log:     MAINTENANCE LOG  DATE OF SERVICE: 1/3/2023  INJURY/CONDITON: Low Back Soreness      MODALITIES:    MISCELLANEOUS:       []Active Release   []Compression Wrap   [x]Cupping    []Support Wrap  []E-Stim- Compex   []Taping  []E-Stim- IFC    []Foam Roller  []E-Stim- Premod   []Cold Tub  []Joint Mobilization   []Contrast Tub  []Manual Therapy   [x]Hot Pack  []Massage    []Hot Tub  []Massage - Scar Tissue  []Ice Cup  []Myofascial Release   []Ice Pack  []Flossing/BFR   []GameReady  []Ultrasound  []Ultrasound - Phonophoresis  []Instrumental Assisted Soft Tissue Mobilization (IASTM)  []Intermittent Compression - Normatec  []Massage Gun  []ROM - Active  []ROM - Passive  []Stretching - Active  []Stretching - Dynamic  []Stretching - Passive  []Stretching - PNF  []Stretching - Static  [x]Mobility Work - Hip/Back  []Mobility Work - Ankle  []Mobility Work - Shoulder

## 2024-03-16 ENCOUNTER — OFFICE VISIT (OUTPATIENT)
Dept: URGENT CARE | Facility: CLINIC | Age: 24
End: 2024-03-16
Payer: COMMERCIAL

## 2024-03-16 VITALS
HEART RATE: 76 BPM | HEIGHT: 72 IN | BODY MASS INDEX: 23.7 KG/M2 | SYSTOLIC BLOOD PRESSURE: 153 MMHG | TEMPERATURE: 99 F | OXYGEN SATURATION: 99 % | RESPIRATION RATE: 18 BRPM | WEIGHT: 175 LBS | DIASTOLIC BLOOD PRESSURE: 97 MMHG

## 2024-03-16 DIAGNOSIS — N34.2 URETHRITIS: ICD-10-CM

## 2024-03-16 DIAGNOSIS — Z20.2 POSSIBLE EXPOSURE TO STD: Primary | ICD-10-CM

## 2024-03-16 DIAGNOSIS — I10 HYPERTENSION, UNSPECIFIED TYPE: ICD-10-CM

## 2024-03-16 LAB
BILIRUB UR QL STRIP: NEGATIVE
GLUCOSE UR QL STRIP: NEGATIVE
KETONES UR QL STRIP: NEGATIVE
LEUKOCYTE ESTERASE UR QL STRIP: NEGATIVE
PH, POC UA: 6.5 (ref 5–8)
POC BLOOD, URINE: NEGATIVE
POC NITRATES, URINE: NEGATIVE
PROT UR QL STRIP: NEGATIVE
SP GR UR STRIP: 1.01 (ref 1–1.03)
UROBILINOGEN UR STRIP-ACNC: NORMAL (ref 0.3–2.2)

## 2024-03-16 PROCEDURE — 96372 THER/PROPH/DIAG INJ SC/IM: CPT | Mod: S$GLB,,, | Performed by: FAMILY MEDICINE

## 2024-03-16 PROCEDURE — 87661 TRICHOMONAS VAGINALIS AMPLIF: CPT | Performed by: FAMILY MEDICINE

## 2024-03-16 PROCEDURE — 81003 URINALYSIS AUTO W/O SCOPE: CPT | Mod: QW,S$GLB,, | Performed by: FAMILY MEDICINE

## 2024-03-16 PROCEDURE — 99214 OFFICE O/P EST MOD 30 MIN: CPT | Mod: 25,S$GLB,, | Performed by: FAMILY MEDICINE

## 2024-03-16 PROCEDURE — 87491 CHLMYD TRACH DNA AMP PROBE: CPT | Performed by: FAMILY MEDICINE

## 2024-03-16 RX ORDER — DOXYCYCLINE HYCLATE 100 MG
100 TABLET ORAL 2 TIMES DAILY
Qty: 14 TABLET | Refills: 0 | Status: SHIPPED | OUTPATIENT
Start: 2024-03-16 | End: 2024-03-23

## 2024-03-16 RX ORDER — CEFTRIAXONE 500 MG/1
500 INJECTION, POWDER, FOR SOLUTION INTRAMUSCULAR; INTRAVENOUS
Status: COMPLETED | OUTPATIENT
Start: 2024-03-16 | End: 2024-03-16

## 2024-03-16 RX ADMIN — CEFTRIAXONE 500 MG: 500 INJECTION, POWDER, FOR SOLUTION INTRAMUSCULAR; INTRAVENOUS at 01:03

## 2024-03-16 NOTE — PROGRESS NOTES
Subjective:      Patient ID: Jose Emery is a 23 y.o. male.    Vitals:  height is 6' (1.829 m) and weight is 79.4 kg (175 lb). His oral temperature is 98.5 °F (36.9 °C). His blood pressure is 153/97 (abnormal) and his pulse is 76. His respiration is 18 and oxygen saturation is 99%.     Chief Complaint: Exposure to STD    PT IS HERE WITH C/O STD TESTING. PT EXPRESSES HE'S HAD UNPROTECTED SEX 2 DAYS. PT STATES HE'S NOTICING A SMALL WHITE DISCHARGE.  PATIENT DENIES FEVER, CHILLS, PELVIC PAIN, RASH, DYSURIA, URINE FREQUENCY.  Blood pressure is found to be moderately elevated.  Patient denies history of hypertension.  Denies headache, chest pain, SOB, weakness.    Exposure to STD  The patient's primary symptoms include penile discharge. This is a new problem. The current episode started yesterday. The problem has been unchanged. The penile discharge was White. He is sexually active. He consistently uses condoms. It is unknown whether or not his partner has an STD.       Genitourinary:  Positive for penile discharge.      Objective:     Physical Exam   Constitutional: He is oriented to person, place, and time. He appears well-developed. No distress.   HENT:   Head: Normocephalic and atraumatic.   Ears:   Right Ear: Tympanic membrane, external ear and ear canal normal. impacted cerumen  Left Ear: Tympanic membrane, external ear and ear canal normal. impacted cerumen  Nose: Nose normal. No rhinorrhea, nasal deformity or congestion. No epistaxis.   Mouth/Throat: Oropharynx is clear and moist and mucous membranes are normal. No oropharyngeal exudate or posterior oropharyngeal erythema.   Eyes: Conjunctivae and lids are normal.   Neck: Trachea normal and phonation normal. Neck supple.   Cardiovascular: Normal rate, regular rhythm and normal heart sounds.   No murmur heard.  Pulmonary/Chest: Effort normal and breath sounds normal. No stridor. No respiratory distress. He has no wheezes. He has no rhonchi. He has no rales.    Abdominal: Normal appearance and bowel sounds are normal. He exhibits no distension. Soft. There is no abdominal tenderness. There is no left CVA tenderness and no right CVA tenderness.   Genitourinary:    Testes and penis normal.     Musculoskeletal: Normal range of motion.         General: Normal range of motion.      Cervical back: He exhibits no tenderness.   Lymphadenopathy:     He has no cervical adenopathy.   Neurological: He is alert, oriented to person, place, and time and at baseline. He has normal reflexes.   Skin: Skin is warm, dry, intact and not diaphoretic.   Psychiatric: His speech is normal and behavior is normal. Judgment and thought content normal.   Nursing note and vitals reviewed.      Assessment:     1. Possible exposure to STD    2. Urethritis    3. Hypertension, unspecified type        Plan:   Discussed exam findings/results/diagnosis/plan with patient. Advised to f/u with PCP within 2-5 days. ER precautions given if symptoms get any worse. All questions answered. Patient verbally understood and agreed with treatment plan.  Educational materials and instructions regarding the visit diagnosis and management provided.     Possible exposure to STD  -     POCT Urinalysis, Dipstick, Automated, W/O Scope  -     C. trachomatis/N. gonorrhoeae by AMP DNA  -     Trichomonas vaginalis, RNA, Qual, Urine (Males Only)    Urethritis  -     C. trachomatis/N. gonorrhoeae by AMP DNA    Hypertension, unspecified type    Other orders  -     cefTRIAXone injection 500 mg  -     doxycycline (VIBRA-TABS) 100 MG tablet; Take 1 tablet (100 mg total) by mouth 2 (two) times daily. for 7 days  Dispense: 14 tablet; Refill: 0

## 2024-03-17 LAB
C TRACH DNA SPEC QL NAA+PROBE: NOT DETECTED
N GONORRHOEA DNA SPEC QL NAA+PROBE: NOT DETECTED

## 2024-03-19 LAB
SPECIMEN SOURCE: NORMAL
T VAGINALIS RRNA SPEC QL NAA+PROBE: NEGATIVE

## 2024-03-20 NOTE — PROGRESS NOTES
[facility] NOTE  Date of Evaluation: 03/20/2024    HPI:  Last fall, patient has suffered a fall onto his right elbow.  He is right hand dominant.  No previous elbow injuries.  Initially there was pain and swelling but he was able to finish the season.  Presents today with a fluid collection on the posterior forearm just distal to the olecranon that is nontender.  Athlete space that this fluid collection has shrunk over the last few days.  He is still able to function and has some pain.    : Obed  Sport:  Basketball  Year: Senior  Major:  Business  [place]:  [place]      REVIEW OF SYSTEMS:   Constitution: Patient denies fever or chills.  Eyes: Patient denies eye pain or vision changes.  CVS: Patient denies chest pain.  Lungs: Patient denies shortness of breath or cough.  Abdomen: Patient denies any stomach pain, nausea, vomiting, or diarrhea  Skin: Patient denies skin rash or itching.    Musculoskeletal: Patient denies recent injuries or trauma.  Neuro: Patient denies any numbness or tingling in upper or lower extremities.  Psych: Patient denies any current anxiety or nervousness.     PHYSICAL EXAMINATION:  Gen: NAD.  Psych: Affect & judgment nl.  Neuro: Grossly CNI. PUGH.  HEENT: -Trach dev. -Eye d/c. -Rhinorrhea.  CV: Color nl. -E/C/C. WWPx4.  Pulm: -Dyspnea. -Cough.  Lymph: -Edema.  Int: -Rash/lesion noted. Skin is warm and dry    MSK:  Right Elbow   Inspection/Palpation:    -Deformity   -Ecchymosis   + walnut size fluid collection posterior forearm just distal to the olecranon bursa, nontender   -Brien sign     TTP:   -med epicondyle   -lat epicondyle   -olecranon   -prox radius   -ext bundle   -flexor bundle   +ucl/medial joint line    Special:    -Hook test    -Tinel's at ulnar tunnel    -Tinel's at Cubital Tunnel   -Froment's sign   -Marisa sign   -Wartenberg sign   -Goodridge's sign   -Mini sign    ROM:    FROM in flexion, extension, supination, pronation    No pain  with resisted flexion, extension, supination, pronation    Medial elbow pain with valgus stress    Strength (* = with pain):    5/5 flex   5/5 ext   5/5 sup   5/5 pronation     Sensation intact in dists:   median n   radial n   ulnar n   all fingertips     Circ:   WWP   Radial pulse 2+      ASSESSMENT/PLAN:  continue rehab    IMAGING TO BE DONE: none at this time    FOLLOW-UP:  prn            All questions have been answered.  Plan was discussed with athletes .  Communication was sent to my [facility] regarding any orders/appointments/labs that need to be scheduled.    This note was completed in the presence of the physician noted above    This note is dictated using the M*Modal Fluency Direct word recognition program. There are word recognition mistakes that are occasionally missed on review.

## 2024-12-12 ENCOUNTER — LAB VISIT (OUTPATIENT)
Dept: LAB | Facility: HOSPITAL | Age: 24
End: 2024-12-12
Attending: INTERNAL MEDICINE
Payer: COMMERCIAL

## 2024-12-12 ENCOUNTER — OFFICE VISIT (OUTPATIENT)
Dept: INTERNAL MEDICINE | Facility: CLINIC | Age: 24
End: 2024-12-12
Payer: COMMERCIAL

## 2024-12-12 VITALS
SYSTOLIC BLOOD PRESSURE: 134 MMHG | DIASTOLIC BLOOD PRESSURE: 72 MMHG | WEIGHT: 174.38 LBS | OXYGEN SATURATION: 100 % | HEIGHT: 72 IN | HEART RATE: 52 BPM | BODY MASS INDEX: 23.62 KG/M2

## 2024-12-12 DIAGNOSIS — Z11.3 SCREENING EXAMINATION FOR STI: Primary | ICD-10-CM

## 2024-12-12 DIAGNOSIS — Z11.3 SCREENING EXAMINATION FOR STI: ICD-10-CM

## 2024-12-12 LAB
HIV 1+2 AB+HIV1 P24 AG SERPL QL IA: NORMAL
TREPONEMA PALLIDUM IGG+IGM AB [PRESENCE] IN SERUM OR PLASMA BY IMMUNOASSAY: NONREACTIVE

## 2024-12-12 PROCEDURE — 36415 COLL VENOUS BLD VENIPUNCTURE: CPT | Performed by: INTERNAL MEDICINE

## 2024-12-12 PROCEDURE — 87389 HIV-1 AG W/HIV-1&-2 AB AG IA: CPT | Performed by: INTERNAL MEDICINE

## 2024-12-12 PROCEDURE — 86593 SYPHILIS TEST NON-TREP QUANT: CPT | Performed by: INTERNAL MEDICINE

## 2024-12-12 PROCEDURE — 99999 PR PBB SHADOW E&M-EST. PATIENT-LVL III: CPT | Mod: PBBFAC,,, | Performed by: INTERNAL MEDICINE

## 2024-12-12 NOTE — PROGRESS NOTES
CHIEF COMPLAINT     Chief Complaint   Patient presents with    STD CHECK       HPI     Jose Emery is a 24 y.o. male here today for     Had unprotected sex 5 days days ago. No known STI exposure but wanting to be tested.   Previous STI testing 3m ago, + chlamydia.  No prior history of syphilis      Personally Reviewed Patient's Medical, surgical, family and social hx. Changes updated in [facility].  Care Team updated in Epic    Review of Systems:  Review of Systems   Genitourinary:  Negative for genital sores, penile discharge, penile pain and penile swelling.       Health Maintenance:   Reviewed with patient  Due for the following:      PHYSICAL EXAM     /72 (BP Location: Right arm, Patient Position: Sitting)   Pulse (!) 52   Ht 6' (1.829 m)   Wt 79.1 kg (174 lb 6.1 oz)   SpO2 100%   BMI 23.65 kg/m²     Gen: Well Appearing, NAD      LABS     Labs reviewed; Notable for    ASSESSMENT     1. Screening examination for STI  C. trachomatis/N. gonorrhoeae by AMP DNA [facility]; Urine    Treponema Pallidium Antibodies IgG, IgM    HIV 1/2 Ag/Ab (4th Gen)              Plan     Jose Emery is a 24 y.o. male with  1. Screening examination for STI  - C. trachomatis/N. gonorrhoeae by AMP DNA [facility]; Urine; Future  - Treponema Pallidium Antibodies IgG, IgM; Future  - HIV 1/2 Ag/Ab (4th Gen); Future  Will check G/c, syphilis and HIV. Explained that too soon for HIV from 5 days ago to change   Counseled risk reduction and barrier protection.  Will benefit from periodic STI screening based on sexual practices.    Jaden Frank MD

## 2025-02-04 ENCOUNTER — OFFICE VISIT (OUTPATIENT)
Dept: URGENT CARE | Facility: CLINIC | Age: 25
End: 2025-02-04
Payer: COMMERCIAL

## 2025-02-04 VITALS
DIASTOLIC BLOOD PRESSURE: 82 MMHG | RESPIRATION RATE: 20 BRPM | OXYGEN SATURATION: 99 % | WEIGHT: 175 LBS | BODY MASS INDEX: 23.7 KG/M2 | HEIGHT: 72 IN | HEART RATE: 78 BPM | TEMPERATURE: 98 F | SYSTOLIC BLOOD PRESSURE: 142 MMHG

## 2025-02-04 DIAGNOSIS — R36.9 PENILE DISCHARGE: Primary | ICD-10-CM

## 2025-02-04 LAB
BILIRUBIN, UA POC OHS: NEGATIVE
BLOOD, UA POC OHS: NEGATIVE
CLARITY, UA POC OHS: CLEAR
COLOR, UA POC OHS: YELLOW
GLUCOSE, UA POC OHS: NEGATIVE
KETONES, UA POC OHS: NEGATIVE
LEUKOCYTES, UA POC OHS: NEGATIVE
NITRITE, UA POC OHS: NEGATIVE
PH, UA POC OHS: 6
PROTEIN, UA POC OHS: NEGATIVE
SPECIFIC GRAVITY, UA POC OHS: 1.01
UROBILINOGEN, UA POC OHS: 0.2

## 2025-02-04 PROCEDURE — 87661 TRICHOMONAS VAGINALIS AMPLIF: CPT | Performed by: SURGERY

## 2025-02-04 PROCEDURE — 99213 OFFICE O/P EST LOW 20 MIN: CPT | Mod: S$GLB,,, | Performed by: SURGERY

## 2025-02-04 PROCEDURE — 87491 CHLMYD TRACH DNA AMP PROBE: CPT | Performed by: SURGERY

## 2025-02-04 PROCEDURE — 81003 URINALYSIS AUTO W/O SCOPE: CPT | Mod: QW,S$GLB,, | Performed by: SURGERY

## 2025-02-04 NOTE — PROGRESS NOTES
Subjective:      Patient ID: Jose Emery is a 24 y.o. male.    Vitals:  height is 6' (1.829 m) and weight is 79.4 kg (175 lb). His oral temperature is 97.7 °F (36.5 °C). His blood pressure is 142/82 (abnormal) and his pulse is 78. His respiration is 20 and oxygen saturation is 99%.     Chief Complaint: STD CHECK    This is a 24 y.o. male who presents today in clinic for routine STD testing.  Patient states 2 days ago he had white penile discharge. No new sexual partners.  Denies any other symptoms. He was tested one month ago, just 5 days after last sexual encounter. He has unprotected sex with females only.  He is concerned his test may have been done too soon after last exposure.     Exposure to STD  The patient's primary symptoms include penile discharge. The patient's pertinent negatives include no genital itching, genital lesions, pelvic pain, penile pain, priapism, scrotal swelling or testicular pain. This is a new problem. The current episode started in the past 7 days. The problem occurs intermittently. The problem has been unchanged. The patient is experiencing no pain. Pertinent negatives include no abdominal pain, chest pain, chills, constipation, coughing, diarrhea, discolored urine, dysuria, fever, flank pain, frequency, headaches, hematuria, hesitancy, joint pain, joint swelling, nausea, painful intercourse, rash, shortness of breath, sore throat, urgency, urinary retention or vomiting. Nothing aggravates the symptoms. He has tried nothing for the symptoms. He is sexually active.       Constitution: Negative for chills and fever.   HENT:  Negative for sore throat.    Cardiovascular:  Negative for chest pain.   Respiratory:  Negative for cough and shortness of breath.    Gastrointestinal:  Negative for abdominal pain, nausea, vomiting, constipation and diarrhea.   Genitourinary:  Positive for penile discharge. Negative for dysuria, frequency, urgency, flank pain, penile pain, scrotal swelling,  testicular pain and pelvic pain.   Skin:  Negative for rash.   Neurological:  Negative for headaches.      Objective:     Physical Exam   Constitutional: He is oriented to person, place, and time. He appears well-developed. No distress.   HENT:   Head: Normocephalic and atraumatic.   Ears:   Right Ear: External ear normal.   Left Ear: External ear normal.   Nose: Nose normal. No nasal deformity. No epistaxis.   Mouth/Throat: Oropharynx is clear and moist and mucous membranes are normal.   Eyes: Conjunctivae and lids are normal.   Neck: Trachea normal and phonation normal. Neck supple.   Cardiovascular: Normal rate, regular rhythm and normal heart sounds.   Pulmonary/Chest: Effort normal and breath sounds normal.   Abdominal: Normal appearance and bowel sounds are normal. He exhibits no distension. Soft. There is no abdominal tenderness.   Musculoskeletal: Normal range of motion.         General: Normal range of motion.   Neurological: He is alert and oriented to person, place, and time. He has normal reflexes.   Skin: Skin is warm, dry, intact and not diaphoretic.   Psychiatric: His speech is normal and behavior is normal. Judgment and thought content normal.   Nursing note and vitals reviewed.      Assessment:     1. Penile discharge        Plan:       Penile discharge  -     POCT Urinalysis(Instrument)  -     C. trachomatis/N. gonorrhoeae by AMP DNA  -     Trichomonas vaginalis, RNA, Qual, Urine      Results for orders placed or performed in visit on 02/04/25   POCT Urinalysis(Instrument)    Collection Time: 02/04/25  4:07 PM   Result Value Ref Range    Color, POC UA Yellow Yellow, Straw, Colorless    Clarity, POC UA Clear Clear    Glucose, POC UA Negative Negative    Bilirubin, POC UA Negative Negative    Ketones, POC UA Negative Negative    Spec Grav POC UA 1.015 1.005 - 1.030    Blood, POC UA Negative Negative    pH, POC UA 6.0 5.0 - 8.0    Protein, POC UA Negative Negative    Urobilinogen, POC UA 0.2 <=1.0     Nitrite, POC UA Negative Negative    WBC, POC UA Negative Negative

## 2025-02-07 ENCOUNTER — PATIENT MESSAGE (OUTPATIENT)
Dept: URGENT CARE | Facility: CLINIC | Age: 25
End: 2025-02-07
Payer: COMMERCIAL

## 2025-02-07 LAB
SPECIMEN SOURCE: NORMAL
T VAGINALIS RRNA SPEC QL NAA+PROBE: NEGATIVE

## 2025-02-09 LAB
C TRACH DNA SPEC QL NAA+PROBE: NOT DETECTED
N GONORRHOEA DNA SPEC QL NAA+PROBE: NOT DETECTED

## 2025-06-09 ENCOUNTER — OFFICE VISIT (OUTPATIENT)
Dept: URGENT CARE | Facility: CLINIC | Age: 25
End: 2025-06-09
Payer: COMMERCIAL

## 2025-06-09 VITALS
OXYGEN SATURATION: 99 % | DIASTOLIC BLOOD PRESSURE: 92 MMHG | WEIGHT: 175 LBS | RESPIRATION RATE: 18 BRPM | TEMPERATURE: 98 F | SYSTOLIC BLOOD PRESSURE: 150 MMHG | BODY MASS INDEX: 23.7 KG/M2 | HEIGHT: 72 IN | HEART RATE: 55 BPM

## 2025-06-09 DIAGNOSIS — R00.1 ASYMPTOMATIC BRADYCARDIA: ICD-10-CM

## 2025-06-09 DIAGNOSIS — Z11.3 SCREENING FOR STDS (SEXUALLY TRANSMITTED DISEASES): Primary | ICD-10-CM

## 2025-06-09 PROCEDURE — 87389 HIV-1 AG W/HIV-1&-2 AB AG IA: CPT

## 2025-06-09 PROCEDURE — 86593 SYPHILIS TEST NON-TREP QUANT: CPT

## 2025-06-09 PROCEDURE — 80074 ACUTE HEPATITIS PANEL: CPT

## 2025-06-09 PROCEDURE — 99213 OFFICE O/P EST LOW 20 MIN: CPT | Mod: S$GLB,,,

## 2025-06-09 PROCEDURE — 87591 N.GONORRHOEAE DNA AMP PROB: CPT

## 2025-06-09 PROCEDURE — 87661 TRICHOMONAS VAGINALIS AMPLIF: CPT

## 2025-06-09 NOTE — PROGRESS NOTES
"Subjective:      Patient ID: Jose Emery is a 25 y.o. male.    Vitals:  height is 6' (1.829 m) and weight is 79.4 kg (175 lb). His oral temperature is 97.8 °F (36.6 °C). His blood pressure is 150/92 (abnormal) and his pulse is 55 (abnormal). His respiration is 18 and oxygen saturation is 99%.     Chief Complaint: Exposure to STD    This pt states he is here today for sti testing. Pt states he is unsure if his partner "has anything." No nausea, vomiting, diarrhea, or fever.     Provider note starts below:  Patient presents to clinic for STI testing. States he had unprotected intercourse on 6/5/25. Partner's STD status is unknown. He had one episode of mild, clear penile discharge the next morning, but no discharge since then. He denies any other symptoms at this time. Pt denies scrotum swelling, testicular pain, penile pain, penile swelling, dysuria, hematuria, genital sores, N/V, or diaphoresis.         Exposure to STD  The patient's primary symptoms include penile discharge. The patient's pertinent negatives include no pelvic pain, penile pain, scrotal swelling or testicular pain. This is a new problem. Pertinent negatives include no chest pain, chills, constipation, coughing, diarrhea, dysuria, fever, flank pain, frequency, headaches, nausea, rash, shortness of breath, urgency or vomiting. He is sexually active. He inconsistently uses condoms. It is unknown whether or not his partner has an STD.       Constitution: Negative for chills and fever.   Neck: Negative for neck pain.   Cardiovascular:  Negative for chest pain, palpitations and sob on exertion.   Respiratory:  Negative for cough and shortness of breath.    Gastrointestinal:  Negative for nausea, vomiting, constipation and diarrhea.   Genitourinary:  Positive for penile discharge. Negative for dysuria, frequency, urgency, flank pain, hematuria, genital sore, painful ejaculation, penile pain, penile swelling, scrotal swelling, testicular pain and pelvic " pain.   Skin:  Negative for pale and rash.   Allergic/Immunologic: Negative for itching.   Neurological:  Negative for dizziness, headaches, disorientation and altered mental status.   Psychiatric/Behavioral:  Negative for altered mental status, disorientation and confusion.       Objective:     Physical Exam   Constitutional: He is oriented to person, place, and time.  Non-toxic appearance. He does not appear ill. No distress.   HENT:   Head: Normocephalic and atraumatic.   Eyes: Conjunctivae are normal. Extraocular movement intact   Neck: Neck supple.   Cardiovascular: Normal heart sounds and normal pulses. Bradycardia present.   No murmur heard.Exam reveals no gallop and no friction rub.   Pulmonary/Chest: Effort normal. No respiratory distress.   Abdominal: Normal appearance.   Musculoskeletal: Normal range of motion.         General: Normal range of motion.   Neurological: He is alert, oriented to person, place, and time and at baseline.   Skin: Skin is warm and dry.   Psychiatric: His behavior is normal. Mood normal.   Nursing note and vitals reviewed.    Assessment:     1. Screening for STDs (sexually transmitted diseases)    2. Asymptomatic bradycardia        Plan:     Screening for STDs (sexually transmitted diseases)  -     C. trachomatis/N. gonorrhoeae by AMP DNA  -     Trichomonas vaginalis, RNA, Qual  -     Treponema Pallidium Antibodies IgG, IgM  -     HIV 1/2 Ag/Ab (4th Gen)  -     HEPATITIS PANEL, ACUTE    Asymptomatic bradycardia      Medical Decision Making:   Urgent Care Management:  HR 43-47 bpm on arrival.  Rechecked pulse myself, which was 55 bpm.  Pt is [employer] , likely athlete's bradycardia.   Follows up with cardiology yearly due to heart murmur as a child. No murmurs auscultated today.        Patient Instructions   You will get a phone call in 3-5 days from a provider regarding your STD testing results.    DO NOT have sexual intercourse until you receive your results.    If  "positive: the appropriate treatment will be discussed and sent to your pharmacy. You will need to notify any recent partners so that they can be tested and treated as well. If you test positive for any STDs, repeat testing should be performed 2-3 weeks after completing treatment to ensure a cure was reached. NO sexual intercourse for 7-14 days after treatment.     If negative: no treatment will be required and you may resume sexual activities. If you develop any fever, chills, nausea, overwhelming fatigue, please seek care for re-testing. The most important thing you can do is to wear a condom every time you have sex. Both male and female condoms can protect against STIs. But be aware that male condoms made out of "natural materials," such as sheep intestine, do not protect against STIs.    Please remember that you have received care at an urgent care today. Urgent cares are not emergency rooms and are not equipped to handle life threatening emergencies and cannot rule in or out certain medical conditions and you may be released before all of your medical problems are known or treated. Please arrange follow up with your primary care physician or speciality clinic  within 2-5 days if your signs and symptoms have not resolved or worsen. Patient can call our Referral Hotline at [phone] to make an appointment.    Please return here or go to the Emergency Department for any concerns or worsening of condition.Patient was educated on signs/symptoms that would warrant emergent medical attention. Patient verbalized understanding.  Signs of infection. These include a fever of 100.4°F (38°C) or higher, chills, very bad sore throat, pain with passing urine, blood in urine, mouth sores, a wound that will not heal, or anal itching or pain.  Signs come back  Soreness or bleeding in genitals  Bleeding between menstrual periods  Pain during sex       "

## 2025-06-09 NOTE — PATIENT INSTRUCTIONS
"You will get a phone call in 3-5 days from a provider regarding your STD testing results.    DO NOT have sexual intercourse until you receive your results.    If positive: the appropriate treatment will be discussed and sent to your pharmacy. You will need to notify any recent partners so that they can be tested and treated as well. If you test positive for any STDs, repeat testing should be performed 2-3 weeks after completing treatment to ensure a cure was reached. NO sexual intercourse for 7-14 days after treatment.     If negative: no treatment will be required and you may resume sexual activities. If you develop any fever, chills, nausea, overwhelming fatigue, please seek care for re-testing. The most important thing you can do is to wear a condom every time you have sex. Both male and female condoms can protect against STIs. But be aware that male condoms made out of "natural materials," such as sheep intestine, do not protect against STIs.    Please remember that you have received care at an urgent care today. Urgent cares are not emergency rooms and are not equipped to handle life threatening emergencies and cannot rule in or out certain medical conditions and you may be released before all of your medical problems are known or treated. Please arrange follow up with your primary care physician or speciality clinic  within 2-5 days if your signs and symptoms have not resolved or worsen. Patient can call our Referral Hotline at [phone] to make an appointment.    Please return here or go to the Emergency Department for any concerns or worsening of condition.Patient was educated on signs/symptoms that would warrant emergent medical attention. Patient verbalized understanding.  Signs of infection. These include a fever of 100.4°F (38°C) or higher, chills, very bad sore throat, pain with passing urine, blood in urine, mouth sores, a wound that will not heal, or anal itching or pain.  Signs come " back  Soreness or bleeding in genitals  Bleeding between menstrual periods  Pain during sex

## 2025-06-10 LAB
HAV IGM SERPL QL IA: NORMAL
HBV CORE IGM SERPL QL IA: NORMAL
HBV SURFACE AG SERPL QL IA: NORMAL
HCV AB SERPL QL IA: NORMAL
HIV 1+2 AB+HIV1 P24 AG SERPL QL IA: NORMAL
T PALLIDUM IGG+IGM SER QL: NORMAL

## 2025-06-11 ENCOUNTER — RESULTS FOLLOW-UP (OUTPATIENT)
Dept: URGENT CARE | Facility: CLINIC | Age: 25
End: 2025-06-11

## 2025-06-12 LAB
C TRACH DNA SPEC QL NAA+PROBE: NOT DETECTED
CTGC SOURCE (OHS) ORD-325: NORMAL
N GONORRHOEA DNA UR QL NAA+PROBE: NOT DETECTED

## 2025-06-13 ENCOUNTER — PATIENT MESSAGE (OUTPATIENT)
Dept: URGENT CARE | Facility: CLINIC | Age: 25
End: 2025-06-13
Payer: COMMERCIAL

## 2025-06-13 LAB
SPECIMEN SOURCE: NORMAL
T VAGINALIS RRNA SPEC QL NAA+PROBE: NEGATIVE

## 2025-08-28 ENCOUNTER — HOSPITAL ENCOUNTER (OUTPATIENT)
Dept: RADIOLOGY | Facility: HOSPITAL | Age: 25
Discharge: HOME | End: 2025-08-28
Attending: ORTHOPAEDIC SURGERY
Payer: COMMERCIAL

## 2025-08-28 ENCOUNTER — PATIENT MESSAGE (OUTPATIENT)
Dept: SPORTS MEDICINE | Facility: CLINIC | Age: 25
End: 2025-08-28

## 2025-08-28 ENCOUNTER — OFFICE VISIT (OUTPATIENT)
Dept: SPORTS MEDICINE | Facility: CLINIC | Age: 25
End: 2025-08-28
Payer: COMMERCIAL

## 2025-08-28 VITALS
HEIGHT: 72 IN | BODY MASS INDEX: 23.93 KG/M2 | SYSTOLIC BLOOD PRESSURE: 140 MMHG | DIASTOLIC BLOOD PRESSURE: 89 MMHG | WEIGHT: 176.69 LBS | HEART RATE: 48 BPM

## 2025-08-28 DIAGNOSIS — M25.362 INSTABILITY OF LEFT KNEE JOINT: Primary | ICD-10-CM

## 2025-08-28 DIAGNOSIS — M25.562 ACUTE PAIN OF LEFT KNEE: ICD-10-CM

## 2025-08-28 DIAGNOSIS — Z98.890 S/P RECONSTRUCTION OF ACL OF LEFT KNEE USING BONE-PATELLAR TENDON-BONE AUTOGRAFT: ICD-10-CM

## 2025-08-28 DIAGNOSIS — M25.562 LEFT KNEE PAIN, UNSPECIFIED CHRONICITY: ICD-10-CM

## 2025-08-28 PROCEDURE — 99999 PR PBB SHADOW E&M-EST. PATIENT-LVL III: CPT | Mod: PBBFAC,,, | Performed by: ORTHOPAEDIC SURGERY

## 2025-08-28 PROCEDURE — 73564 X-RAY EXAM KNEE 4 OR MORE: CPT | Mod: 26,50,, | Performed by: RADIOLOGY

## 2025-08-28 PROCEDURE — 73564 X-RAY EXAM KNEE 4 OR MORE: CPT | Mod: TC,50

## 2025-09-04 ENCOUNTER — HOSPITAL ENCOUNTER (OUTPATIENT)
Dept: RADIOLOGY | Facility: HOSPITAL | Age: 25
Discharge: HOME | End: 2025-09-04
Attending: ORTHOPAEDIC SURGERY
Payer: COMMERCIAL

## 2025-09-04 DIAGNOSIS — M25.362 INSTABILITY OF LEFT KNEE JOINT: ICD-10-CM

## 2025-09-04 DIAGNOSIS — M25.562 ACUTE PAIN OF LEFT KNEE: ICD-10-CM

## 2025-09-04 PROCEDURE — 73721 MRI JNT OF LWR EXTRE W/O DYE: CPT | Mod: TC,LT

## 2025-09-04 PROCEDURE — 73721 MRI JNT OF LWR EXTRE W/O DYE: CPT | Mod: 26,LT,, | Performed by: RADIOLOGY

## (undated) DEVICE — ADHESIVE MASTISOL VIAL 48/BX

## (undated) DEVICE — PAD CAST SPECIALIST STRL 6

## (undated) DEVICE — BLADE SHAVER LANZA 4.2X13CM

## (undated) DEVICE — BLADE SHAVER 4.5 6/BX

## (undated) DEVICE — UNDERGLOVES BIOGEL PI SIZE 7.5

## (undated) DEVICE — GOWN SMARTGOWN LVL4 X-LONG XL

## (undated) DEVICE — KIT ACL DISP W/O SAW BLADE

## (undated) DEVICE — TUBE SET INFLOW/OUTFLOW

## (undated) DEVICE — PIN GUIDE GRAFT PASS XACTPIN
Type: IMPLANTABLE DEVICE | Site: KNEE | Status: NON-FUNCTIONAL
Removed: 2021-02-10

## (undated) DEVICE — GAUZE SPONGE 4X4 12PLY

## (undated) DEVICE — Device

## (undated) DEVICE — SKIN MARKER DEVON 160

## (undated) DEVICE — BLADE ACL FINE 9.5X25.5X.4MM

## (undated) DEVICE — DRAPE STERI U-SHAPED 47X51IN

## (undated) DEVICE — SUT VICRYL PLUS 3-0 SH 18IN

## (undated) DEVICE — BIT DRILL 1.5MM STRAIGHT

## (undated) DEVICE — BRACE KNEE T SCOPE PREMIER

## (undated) DEVICE — PAD COLD THERAPY KNEE WRAP ON

## (undated) DEVICE — SYS CLSR DERMABOND PRINEO 22CM

## (undated) DEVICE — BANDAGE ESMARK 6X12

## (undated) DEVICE — SHAVER SYS 5.5 ULRAFRR

## (undated) DEVICE — SUT MCRYL PLUS 4-0 PS2 27IN

## (undated) DEVICE — CLOSURE SKIN STERI STRIP 1/2X4

## (undated) DEVICE — ELECTRODE 90 DEGREE ANGLE

## (undated) DEVICE — CONTAINER SPECIMEN STRL 4OZ

## (undated) DEVICE — PENCIL ELECTROSURG HOLST W/BLD

## (undated) DEVICE — SOL 9P NACL IRR PIC IL

## (undated) DEVICE — SUT VICRYL PLUS 0 CT1 18IN

## (undated) DEVICE — PAD ABD 8X10 STERILE

## (undated) DEVICE — SEE MEDLINE ITEM 157126

## (undated) DEVICE — SUT 3-0 VICRYL SH CR/8 18

## (undated) DEVICE — SEE MEDLINE ITEM 153151

## (undated) DEVICE — ELECTRODE REM PLYHSV RETURN 9

## (undated) DEVICE — SEE MEDLINE ITEM 157150

## (undated) DEVICE — SUT CTD VICRYL 0 UND BR

## (undated) DEVICE — DRESSING TELFA N ADH 3X8

## (undated) DEVICE — BLADE SURG STAINLESS STEEL #15

## (undated) DEVICE — UNDERGLOVES BIOGEL PI SIZE 8

## (undated) DEVICE — SEE MEDLINE ITEM 157169

## (undated) DEVICE — PAD ELECTRODE STER 1.5X3

## (undated) DEVICE — SUT FIBERWIRE

## (undated) DEVICE — APPLICATOR CHLORAPREP ORN 26ML

## (undated) DEVICE — SUT FIBERWIRE LOOP STRAIGHT

## (undated) DEVICE — SEE MEDLINE ITEM 152487

## (undated) DEVICE — DRESSING XEROFORM FOIL PK 1X8

## (undated) DEVICE — TOURNIQUET SB QC DP 34X4IN

## (undated) DEVICE — SOL IRR NACL .9% 3000ML

## (undated) DEVICE — SEE MEDLINE ITEM 146347

## (undated) DEVICE — BLADE SAGITTA 5/BX

## (undated) DEVICE — DRAPE STERI INSTRUMENT 1018

## (undated) DEVICE — REAMER CORING 9MM & COLLAR PIN

## (undated) DEVICE — ADHESIVE DERMABOND ADVANCED

## (undated) DEVICE — SUT FIBERWIRE FIBER 2M

## (undated) DEVICE — DRESSING AQUACEL AG ADV 3.5X6

## (undated) DEVICE — BIT DRILL 10X229 CANN SENTINEL

## (undated) DEVICE — SUT MONOCRYL 4-0 PS-2

## (undated) DEVICE — NDL 18GA X1 1/2 REG BEVEL

## (undated) DEVICE — SYR 30CC LUER LOCK

## (undated) DEVICE — COVER MAYO STAND REINFRCD 30

## (undated) DEVICE — BLADE KNIFE GRAFT10MM PARALLEL

## (undated) DEVICE — GLOVE BIOGEL SKINSENSE PI 8.0